# Patient Record
(demographics unavailable — no encounter records)

---

## 2025-03-10 NOTE — ASSESSMENT
[FreeTextEntry1] : 3-month-old female, first born, twin gestation, breech presentation with ultrasound x 2 (1 month apart) with evidence of mild bilateral hip dysplasia, improving  Today's assessment was performed with the assistance of the patient's parent as an independent historian to corroborate the patients history.  Clinical exam and ultrasound results reviewed at length with mother.  The natural history of hip dysplasia was explained. Improving hip dysplasia noted on serial ultrasounds.  No evidence of hip dysplasia on clinical examination done today.  Option of initiating treatment with a Pallavi harness versus repeating dynamic hip ultrasound in 2 weeks time has been discussed.  Mother has elected to proceed with repeating hip ultrasound in 2 weeks.  This is a reasonable option.  Dr Sinclair will call mother at  with results once complete and we will discuss next steps.  If ultrasound remains unchanged, treatment with Pallavi harness would likely be initiated. All questions answered, understanding verbalized.  Patient and parent in agreement with plan of care.    I Fang Johnston have acted as a scribe and documented the above information for Dr. Sinclair  The above documentation completed by the NP is an accurate record of both my words and actions. Gianni Sinclair MD.  This note was generated using Dragon medical dictation software.  A reasonable effort has been made for proofreading its contents, but typos may still remain.  If there are any questions or points of clarification needed please do not hesitate to contact my office.

## 2025-03-10 NOTE — CONSULT LETTER
[Dear  ___] : Dear  [unfilled], [Consult Letter:] : I had the pleasure of evaluating your patient, [unfilled]. [Please see my note below.] : Please see my note below. [Consult Closing:] : Thank you very much for allowing me to participate in the care of this patient.  If you have any questions, please do not hesitate to contact me. [Sincerely,] : Sincerely, [FreeTextEntry3] : Gianni Sinclair MD [facility] [address] [address] Phone: [phone] Fax: [phone]

## 2025-03-10 NOTE — BIRTH HISTORY
[Duration: ___ wks] : duration: [unfilled] weeks [] :  [Normal?] : normal delivery [___ lbs.] : [unfilled] lbs [___ oz.] : [unfilled] oz. [Was child in NICU?] : Child was in NICU [FreeTextEntry5] : amisha, twin [FreeTextEntry7] : prematurity, 12 days

## 2025-03-10 NOTE — DATA REVIEWED
[de-identified] : 1/27/25: Dynamic hip ultrasound bilateral hips: FINDINGS: The right alpha angle measures 53-56 degrees. There is rounded appearance of the superolateral aspect of the acetabulum. There is 43-46% coverage of the right femoral head by the right acetabulum. There is no further subluxation with stress.  The left alpha angle measures 50-54 degrees. There is rounded appearance of the superolateral aspect of the acetabulum. There is 35-42 % coverage of the left femoral head by the left acetabulum. There is no further subluxation with stress.  IMPRESSION: Abnormal appearance of the hips which could be due to physiologic immaturity in a patient of this age versus hip dysplasia. A follow-up ultrasound can be obtained in 4 weeks to assess for appropriate maturation.   2/24/25: dynamic hip ultrasound bilateral hips: FINDINGS: The right alpha angle measures 53-55 degrees. There is rounded appearance of the superolateral aspect of the acetabulum. There is appropriate coverage of the right femoral head by the right acetabulum. There is no further subluxation with stress.  The left alpha angle measures 49-53 degrees. There is rounded appearance of the superolateral aspect of the acetabulum. There is 43-49 % coverage of the left femoral head by the left acetabulum. There is no further subluxation with stress.  IMPRESSION: Persistent shallow alpha angles most consistent with mild bilateral hip dysplasia in a patient of this age.

## 2025-03-10 NOTE — PHYSICAL EXAM
[FreeTextEntry1] : General: Patient is awake and alert and in no acute distress. Well developed, well nourished, lavell appropriately.   Skin: The skin is intact, warm, pink, and dry over the area examined.  Stork bite to forehead and orbital area  Eyes: normal conjunctiva, normal eyelids and pupils were equal and round.   ENT: normal ears, normal nose and normal lips.  Cardiovascular: There is brisk capillary refill in the digits of the affected extremity. They are symmetric pulses in the bilateral upper and lower extremities, positive peripheral pulses, brisk capillary refill, but no peripheral edema.  Respiratory: The patient is in no apparent respiratory distress. They're taking full deep breaths without use of accessory muscles or evidence of audible wheezes or stridor without the use of a stethoscope, normal respiratory effort.   Neurological: 5/5 motor strength in the main muscle groups of bilateral lower extremities, sensory intact in bilateral lower extremities.   Musculoskeletal: Examination of bilateral lower extremities reveals wide symmetric abduction of bilateral hips to greater than 60. Supple internal and external rotation of bilateral hips.  Negative Ortolani, negative Ferraro.  No limb length discrepancy. No asymmetric thigh or gleutal folds  Bilateral knees with full range of motion. Both knees are clinically stable. Negative Galeazzi.  Bilateral ankle dorsiflexion to +20.  Spine appears grossly midline without midline spine defects. No louis of hair. No dimple, no sinus.

## 2025-04-07 NOTE — HISTORY OF PRESENT ILLNESS
[FreeTextEntry1] : Karen returns.  She is a healthy 4-month-old baby girl who is being followed for bilateral DDH.  She had a recent ultrasound last week which showed persistent immaturity.  Mother was informed about the findings over the phone and it was decided to place her in a Pallavi harness.  She is here today with the baby.  She has been doing well otherwise.

## 2025-04-07 NOTE — PHYSICAL EXAM
[FreeTextEntry1] : Alert, comfortable, in no apparent distress 4-month-old baby girl who allows to be examined.  Is a small size probably harnesses place.  Straps are readjusted.  Mother is instructed as to how to put it back.  It is to be worn 22 hours a day.  Mother is also warned about the possibility of a femoral nerve palsy.

## 2025-04-07 NOTE — ASSESSMENT
[FreeTextEntry1] : Diagnosis: Bilateral DDH placed in a Pallavi harness.  The history was obtained today from the child and parent; given the patient's age and/or the child's mental capacity, the history was unreliable and the parent was used as an independent historian.  Is a healthy 4-month-old baby girl with above diagnosis.  She is placed in a harness.  Mother was instructed as to how to apply it.  She is to return in 2 weeks time for harness check and to order an ultrasound of the hips 2 weeks later.  All of the mother's questions were addressed. She understood and agreed with the plan.   Mother is told to contact the office with any questions or concerns that she may have.  This note was generated using Dragon medical dictation software.  A reasonable effort has been made for proofreading its contents, but typos may still remain.  If there are any questions or points of clarification needed please do not hesitate to contact my office.

## 2025-04-22 NOTE — HISTORY OF PRESENT ILLNESS
[FreeTextEntry1] : Karen returns.  She is a healthy 4-month-old baby girl who is being followed for bilateral DDH.  She is currently in full time luc harness and doing well as per parent.  She is here today with the baby for harness check.

## 2025-04-22 NOTE — ASSESSMENT
[FreeTextEntry1] : Diagnosis: Bilateral DDH in full time Pallavi harness.  The history was obtained today from the child and parent; given the patient's age and/or the child's mental capacity, the history was unreliable and the parent was used as an independent historian.  Is a healthy 4-month-old baby girl with above diagnosis.  The harness is fitting well. She will have repeat ultrasound in 2 weeks.   She is to return in 2 weeks time for harness check and ultrasound review.   All of the mother's questions were addressed. She understood and agreed with the plan.   Mother is told to contact the office with any questions or concerns that she may have. Father's phone number [phone]  IGabrielle MPAS, PAC, have acted as a scribe and documented the above for Dr. Sinclair.   The above documentation completed by the PA is an accurate record of both my words and actions. Gianni Sinclair MD.

## 2025-04-22 NOTE — ASSESSMENT
[FreeTextEntry1] : Diagnosis: Bilateral DDH in full time Pallavi harness.  The history was obtained today from the child and parent; given the patient's age and/or the child's mental capacity, the history was unreliable and the parent was used as an independent historian.  Is a healthy 4-month-old baby girl with above diagnosis.  The harness is fitting well. She will have repeat ultrasound in 2 weeks.   She is to return in 2 weeks time for harness check and ultrasound review.   All of the mother's questions were addressed. She understood and agreed with the plan.   Mother is told to contact the office with any questions or concerns that she may have. Father's phone number [phone]  IGabrielle MPAS, PAC, have acted as a scribe and documented the above for Dr. Sinclair.   The above documentation completed by the PA is an accurate record of both my words and actions. Gianni Sinclair MD.

## 2025-04-22 NOTE — PHYSICAL EXAM
[FreeTextEntry1] : Alert, comfortable, in no apparent distress 4-month-old baby girl who allows to be examined. Harness is fitting well. Knee extension present.  Hips stable.

## 2025-05-07 NOTE — DATA REVIEWED
[de-identified] : An ultrasound of the hips performed this morning was evaluated by me.  They show slight improvement with near normal left hip and some persistent dysplasia on the right.  The radiology report reads: "The right alpha angle measures 55-58 degrees. There is 46-48 % coverage of the right femoral head by the right acetabulum. The left alpha angle measures 54-58 degrees. There is 50-53 % coverage of the left femoral head by the left acetabulum. IMPRESSION: Persistent shallow but mildly improved alpha angles consistent with bilateral hip dysplasia.'

## 2025-05-07 NOTE — HISTORY OF PRESENT ILLNESS
[FreeTextEntry1] : Karen returns.  She is a 5-month-old baby girl who is being treated for mild bilateral DDH with a Pallavi harness.  She is here today with her father after having had an ultrasound of the hips this morning.  Father denies any problems with the harness.

## 2025-05-07 NOTE — PHYSICAL EXAM
[FreeTextEntry1] : Alert, comfortable, well-developed, in no apparent distress 5-month-old baby girl who allows to be examined.  She has a well-placed Pallavi harness.  She keeps her hips at approximately 90 degrees of abduction and 90 to 95 degrees of flexion without any tightness of the harness.  The harness is removed.  She is able to actively kick both of her legs.  No signs of hip instability.  Passive hip abduction 90 degrees bilaterally.